# Patient Record
Sex: MALE | Race: WHITE | NOT HISPANIC OR LATINO | URBAN - METROPOLITAN AREA
[De-identification: names, ages, dates, MRNs, and addresses within clinical notes are randomized per-mention and may not be internally consistent; named-entity substitution may affect disease eponyms.]

---

## 2018-06-01 ENCOUNTER — OUTPATIENT (OUTPATIENT)
Dept: OUTPATIENT SERVICES | Facility: HOSPITAL | Age: 28
LOS: 1 days | End: 2018-06-01

## 2018-06-25 ENCOUNTER — EMERGENCY (EMERGENCY)
Facility: HOSPITAL | Age: 28
LOS: 1 days | Discharge: HOME | End: 2018-06-25
Attending: EMERGENCY MEDICINE | Admitting: EMERGENCY MEDICINE
Payer: MEDICAID

## 2018-06-25 VITALS
HEART RATE: 68 BPM | HEIGHT: 76 IN | RESPIRATION RATE: 16 BRPM | DIASTOLIC BLOOD PRESSURE: 74 MMHG | OXYGEN SATURATION: 97 % | SYSTOLIC BLOOD PRESSURE: 128 MMHG | WEIGHT: 259.93 LBS | TEMPERATURE: 98 F

## 2018-06-25 VITALS
HEART RATE: 61 BPM | OXYGEN SATURATION: 95 % | DIASTOLIC BLOOD PRESSURE: 70 MMHG | RESPIRATION RATE: 15 BRPM | SYSTOLIC BLOOD PRESSURE: 126 MMHG | TEMPERATURE: 98 F

## 2018-06-25 DIAGNOSIS — S98.139A COMPLETE TRAUMATIC AMPUTATION OF ONE UNSPECIFIED LESSER TOE, INITIAL ENCOUNTER: Chronic | ICD-10-CM

## 2018-06-25 PROCEDURE — 99284 EMERGENCY DEPT VISIT MOD MDM: CPT | Mod: 25

## 2018-06-25 PROCEDURE — 73610 X-RAY EXAM OF ANKLE: CPT

## 2018-06-25 PROCEDURE — 73630 X-RAY EXAM OF FOOT: CPT | Mod: 26,LT

## 2018-06-25 PROCEDURE — 73610 X-RAY EXAM OF ANKLE: CPT | Mod: 26,LT

## 2018-06-25 PROCEDURE — 99283 EMERGENCY DEPT VISIT LOW MDM: CPT

## 2018-06-25 PROCEDURE — 73630 X-RAY EXAM OF FOOT: CPT

## 2018-06-25 RX ORDER — OXYCODONE AND ACETAMINOPHEN 5; 325 MG/1; MG/1
1 TABLET ORAL ONCE
Qty: 0 | Refills: 0 | Status: DISCONTINUED | OUTPATIENT
Start: 2018-06-25 | End: 2018-06-25

## 2018-06-25 RX ADMIN — OXYCODONE AND ACETAMINOPHEN 1 TABLET(S): 5; 325 TABLET ORAL at 23:22

## 2018-06-25 NOTE — ED PROVIDER NOTE - PHYSICAL EXAMINATION
ant prox foot  and medial ankle tend L foot: pos dist MT amputation, remote  ant prox foot mild tend and   tend to medial ankle tend

## 2018-06-25 NOTE — ED PROVIDER NOTE - OBJECTIVE STATEMENT
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 Pt did not see an ED yesterday as he was travelling from [place]. Pain is aggravated with bearing weight on the left foot. Denies numbness or tingling in extremities. No other complaints at this time. 27 y/o M pt with hx of crush injury and partial amputation to the left foot when he was 18 years presents to the ED c/o left ankle pain and dorsal aspect of the left foot s/p getting hit in foot by a softball yesterday. Pt did not see an ED yesterday as he was travelling from [place]. Pain is aggravated with bearing weight on the left foot. Denies numbness or tingling in extremities. No other complaints at this time. No fsll.

## 2018-06-25 NOTE — ED PROVIDER NOTE - PROGRESS NOTE DETAILS
Discussed with Patient and/or Family regarding the X-ray findings.  Discussed the risks of occult / secondary fracture or ligamentous/tendon injury. Discussed the importance of close prompt follow-up with Orthopaedics for definitive workup and treatment.  Also discussed injury / neuro precautions.  Verbalization of understanding of all instructions was shown and an opportunity was given for questions.

## 2018-06-25 NOTE — ED PROVIDER NOTE - LOWER EXTREMITY EXAM, LEFT
tenderness on palpation of the left medial ankle and anterior proximal foot. Prior amputation of the left toes and distal part of the left foot noted.

## 2018-06-25 NOTE — ED PROVIDER NOTE - ENMT, MLM
Airway patent, Nasal mucosa clear. Mouth with normal mucosa. Throat has no vesicles, no oropharyngeal exudates and uvula is midline. MM is moist.

## 2018-06-26 DIAGNOSIS — R69 ILLNESS, UNSPECIFIED: ICD-10-CM

## 2018-07-01 ENCOUNTER — OUTPATIENT (OUTPATIENT)
Dept: OUTPATIENT SERVICES | Facility: HOSPITAL | Age: 28
LOS: 1 days | End: 2018-07-01

## 2018-07-01 DIAGNOSIS — S98.139A COMPLETE TRAUMATIC AMPUTATION OF ONE UNSPECIFIED LESSER TOE, INITIAL ENCOUNTER: Chronic | ICD-10-CM

## 2018-07-19 DIAGNOSIS — Z71.89 OTHER SPECIFIED COUNSELING: ICD-10-CM

## 2018-09-01 ENCOUNTER — OUTPATIENT (OUTPATIENT)
Dept: OUTPATIENT SERVICES | Facility: HOSPITAL | Age: 28
LOS: 1 days | End: 2018-09-01
Payer: MEDICAID

## 2018-09-01 DIAGNOSIS — S98.139A COMPLETE TRAUMATIC AMPUTATION OF ONE UNSPECIFIED LESSER TOE, INITIAL ENCOUNTER: Chronic | ICD-10-CM

## 2018-09-01 PROCEDURE — G9001: CPT

## 2018-09-14 DIAGNOSIS — Z71.89 OTHER SPECIFIED COUNSELING: ICD-10-CM

## 2018-10-22 ENCOUNTER — EMERGENCY (EMERGENCY)
Facility: HOSPITAL | Age: 28
LOS: 1 days | Discharge: HOME | End: 2018-10-22
Attending: EMERGENCY MEDICINE | Admitting: EMERGENCY MEDICINE
Payer: MEDICAID

## 2018-10-22 VITALS
RESPIRATION RATE: 16 BRPM | TEMPERATURE: 97 F | HEART RATE: 54 BPM | HEIGHT: 76 IN | WEIGHT: 257.06 LBS | DIASTOLIC BLOOD PRESSURE: 64 MMHG | SYSTOLIC BLOOD PRESSURE: 115 MMHG | OXYGEN SATURATION: 95 %

## 2018-10-22 VITALS
HEART RATE: 74 BPM | DIASTOLIC BLOOD PRESSURE: 68 MMHG | TEMPERATURE: 98 F | SYSTOLIC BLOOD PRESSURE: 112 MMHG | RESPIRATION RATE: 16 BRPM | OXYGEN SATURATION: 98 %

## 2018-10-22 DIAGNOSIS — S98.139A COMPLETE TRAUMATIC AMPUTATION OF ONE UNSPECIFIED LESSER TOE, INITIAL ENCOUNTER: Chronic | ICD-10-CM

## 2018-10-22 PROCEDURE — 96372 THER/PROPH/DIAG INJ SC/IM: CPT

## 2018-10-22 PROCEDURE — 99283 EMERGENCY DEPT VISIT LOW MDM: CPT | Mod: 25

## 2018-10-22 PROCEDURE — 99283 EMERGENCY DEPT VISIT LOW MDM: CPT

## 2018-10-22 RX ORDER — KETOROLAC TROMETHAMINE 30 MG/ML
30 SYRINGE (ML) INJECTION ONCE
Qty: 0 | Refills: 0 | Status: DISCONTINUED | OUTPATIENT
Start: 2018-10-22 | End: 2018-10-22

## 2018-10-22 RX ORDER — CYCLOBENZAPRINE HYDROCHLORIDE 10 MG/1
1 TABLET, FILM COATED ORAL
Qty: 20 | Refills: 0 | OUTPATIENT
Start: 2018-10-22 | End: 2018-11-10

## 2018-10-22 RX ORDER — CYCLOBENZAPRINE HYDROCHLORIDE 10 MG/1
10 TABLET, FILM COATED ORAL ONCE
Qty: 0 | Refills: 0 | Status: COMPLETED | OUTPATIENT
Start: 2018-10-22 | End: 2018-10-22

## 2018-10-22 RX ORDER — LIDOCAINE 4 G/100G
1 CREAM TOPICAL ONCE
Qty: 0 | Refills: 0 | Status: COMPLETED | OUTPATIENT
Start: 2018-10-22 | End: 2018-10-22

## 2018-10-22 RX ADMIN — Medication 30 MILLIGRAM(S): at 11:33

## 2018-10-22 RX ADMIN — Medication 30 MILLIGRAM(S): at 11:34

## 2018-10-22 RX ADMIN — LIDOCAINE 1 PATCH: 4 CREAM TOPICAL at 11:35

## 2018-10-22 RX ADMIN — CYCLOBENZAPRINE HYDROCHLORIDE 10 MILLIGRAM(S): 10 TABLET, FILM COATED ORAL at 11:32

## 2018-10-22 NOTE — ED PROVIDER NOTE - TIMING
Bedside shift change report given to [address] [place] [address] (oncoming nurse) by Maurice Corea RN (offgoing nurse). Rep included the following information SBAR, Kardex, ED Summary, MAR and Recent Results. gradual onset

## 2018-10-22 NOTE — ED PROVIDER NOTE - MEDICAL DECISION MAKING DETAILS
right thoracic back pain since this morning. palpable muscle spasm to right thoracic region. no red flags. no midline tenderness. no fevers or history of IV drug use. do not suspect vert fx, epidural abscess, or cord compression. pain likely muscular in nature. will tx symptomatically with toradol, flexeril, and lidocaine patch. will prescribe medrol dose pack and flexeril.

## 2018-10-22 NOTE — ED PROVIDER NOTE - NEUROLOGICAL, MLM
Alert and oriented, no focal deficits, no motor or sensory deficits. no toe or foot drop. good  strength bilaterally. ambulatory with steady gait

## 2018-10-22 NOTE — ED PROVIDER NOTE - CHPI ED SYMPTOMS NEG
no anorexia/no tingling/no fatigue/no numbness/no bladder dysfunction/no bowel dysfunction/no constipation

## 2018-10-22 NOTE — ED PROVIDER NOTE - NS_ ATTENDINGSCRIBEDETAILS _ED_A_ED_FT
Fercho Pisano MD - The scribe's documentation has been prepared under my direction and personally reviewed by me in its entirety. I confirm that the note above accurately reflects all work, treatment, procedures, and medical decision making performed by me.

## 2018-10-22 NOTE — ED ADULT NURSE NOTE - OBJECTIVE STATEMENT
Pt presents with complaint of feeling a pop last night when he stretched. Complaint of pain right mid back worse on movement. No obvious signs of trauama

## 2018-10-22 NOTE — ED PROVIDER NOTE - ATTENDING CONTRIBUTION TO CARE
I have personally performed a face to face diagnostic evaluation on this patient.  I have reviewed the PA note and agree with the history, exam, and plan of care, except as noted.  History and Exam by me shows  right mid pain today.  pain with movement only.  See Progress Note

## 2018-10-22 NOTE — ED PROVIDER NOTE - PROGRESS NOTE DETAILS
Attending Contribution to Care: 27 y/o M pt presents to the ED c/o right upper back pain starting this morning at 2 am while sleeping. The pain woke him up from sleep. He took Tylenol for the pain, last dose was 8am. Pain is more when lying supine and raisin his right arm overhead. Denies trauma to the area.   PE: non-tender, no rash, no redness, no obvious deformity noted on the back. Pain on abduction of the right arm. stretches to back explained and demonstrated. An opportunity to ask questions was given.  Discussed the importance of prompt, close medical follow-up.  Patient will return with any changes, concerns or persistent / worsening symptoms.  Understanding of all instructions verbalized.

## 2018-10-22 NOTE — ED PROVIDER NOTE - MUSCULOSKELETAL, MLM
soft tissue tenderness and muscle spasm to right thoracic region and paraspinals. no vert tenderness or step off deformities. full ROM. no ext tenderness

## 2018-10-22 NOTE — ED PROVIDER NOTE - OBJECTIVE STATEMENT
otherwise health male presents with right thoracic back pain that started this morning at 0200. denies any known specific injury or trauma. unsure if he rolled awkwardly. pain initially 8/10. took some tylenol and was able to go back to sleep. pain returned again about 0630 this morning and took additional tylenol about 0800. pain now 4/10. pain worse with movement and improves with remaining still. denies chest pain or JOAN. no pain or weakness in ext. no fevers or history of IV drug use. no loss of bowel or bladder control. patient does state he dove for a ball in the outfield earlier that day while playing softball. doesn't remember immediate pain but unsure if related.  YOLANDA Bar

## 2023-10-05 NOTE — ED ADULT TRIAGE NOTE - BSA (M2)
2.46 Eucrisa Counseling: Patient may experience a mild burning sensation during topical application. Eucrisa is not approved in children less than 2 years of age.